# Patient Record
Sex: MALE | URBAN - METROPOLITAN AREA
[De-identification: names, ages, dates, MRNs, and addresses within clinical notes are randomized per-mention and may not be internally consistent; named-entity substitution may affect disease eponyms.]

---

## 2021-04-01 ENCOUNTER — OFFICE VISIT (OUTPATIENT)
Dept: URBAN - METROPOLITAN AREA CLINIC 56 | Facility: CLINIC | Age: 86
End: 2021-04-01
Payer: MEDICARE

## 2021-04-01 DIAGNOSIS — H18.421 BAND KERATOPATHY, RIGHT EYE: ICD-10-CM

## 2021-04-01 DIAGNOSIS — H52.4 PRESBYOPIA: ICD-10-CM

## 2021-04-01 DIAGNOSIS — H52.222 REGULAR ASTIGMATISM, LEFT EYE: ICD-10-CM

## 2021-04-01 DIAGNOSIS — H26.492 OTHER SECONDARY CATARACT, LEFT EYE: ICD-10-CM

## 2021-04-01 PROCEDURE — 92134 CPTRZ OPH DX IMG PST SGM RTA: CPT | Performed by: OPTOMETRIST

## 2021-04-01 PROCEDURE — 99204 OFFICE O/P NEW MOD 45 MIN: CPT | Performed by: OPTOMETRIST

## 2021-04-01 ASSESSMENT — VISUAL ACUITY
OS: 20/80
OD: LP

## 2021-04-01 ASSESSMENT — INTRAOCULAR PRESSURE: OS: 13

## 2021-04-01 NOTE — IMPRESSION/PLAN
Impression: Presbyopia: H52.4. Plan: Informed and demonstrated to patient filling this spectacle  prescription will provide little or no improvement in vision due to ophthalmic or visual pathway changes. May revisit after Yag Cap OS.

## 2021-04-01 NOTE — IMPRESSION/PLAN
Impression: Other secondary cataract, left eye: H26.492. Plan: Discussed procedure. Risks, benefits, alternatives reviewed with patient. Patient elects to proceed with YAG. Refer for YAG capsulotomy consultation OS.

## 2021-04-01 NOTE — IMPRESSION/PLAN
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 Plan: Longstanding since 1970's. Unresponsive to surgery.

## 2021-04-16 ENCOUNTER — ADULT PHYSICAL (OUTPATIENT)
Dept: URBAN - METROPOLITAN AREA CLINIC 56 | Facility: CLINIC | Age: 86
End: 2021-04-16
Payer: MEDICARE

## 2021-04-16 DIAGNOSIS — Z01.818 ENCOUNTER FOR OTHER PREPROCEDURAL EXAMINATION: Primary | ICD-10-CM

## 2021-04-16 PROCEDURE — 99203 OFFICE O/P NEW LOW 30 MIN: CPT | Performed by: PHYSICIAN ASSISTANT

## 2021-04-23 ENCOUNTER — SURGERY (OUTPATIENT)
Dept: URBAN - METROPOLITAN AREA SURGERY 19 | Facility: SURGERY | Age: 86
End: 2021-04-23
Payer: MEDICARE

## 2021-04-23 PROCEDURE — 66821 AFTER CATARACT LASER SURGERY: CPT | Performed by: OPHTHALMOLOGY

## 2021-07-22 ENCOUNTER — OFFICE VISIT (OUTPATIENT)
Dept: URBAN - METROPOLITAN AREA CLINIC 44 | Facility: CLINIC | Age: 86
End: 2021-07-22
Payer: MEDICARE

## 2021-07-22 DIAGNOSIS — H35.3122 NEXDTVE AGE-RELATED MCLR DEGN, LEFT EYE, INTERMED DRY STAGE: Primary | ICD-10-CM

## 2021-07-22 DIAGNOSIS — Z96.1 PRESENCE OF INTRAOCULAR LENS: ICD-10-CM

## 2021-07-22 DIAGNOSIS — H33.051 TOTAL RETINAL DETACHMENT, RIGHT EYE: ICD-10-CM

## 2021-07-22 PROCEDURE — 92014 COMPRE OPH EXAM EST PT 1/>: CPT | Performed by: OPTOMETRIST

## 2021-07-22 PROCEDURE — 92134 CPTRZ OPH DX IMG PST SGM RTA: CPT | Performed by: OPTOMETRIST

## 2021-07-22 ASSESSMENT — VISUAL ACUITY
OD: LP
OS: 20/60

## 2021-07-22 ASSESSMENT — INTRAOCULAR PRESSURE: OS: 14

## 2021-07-22 NOTE — IMPRESSION/PLAN
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  RTC 12 months for complete

## 2021-07-22 NOTE — IMPRESSION/PLAN
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 Longstanding since 1970's. Unresponsive to surgery. Eye is comfortable Plan: PLAN: Observe.  RTC 12 months for complete

## 2021-07-22 NOTE — IMPRESSION/PLAN
Impression: Presence of intraocular lens: Z96.1. IOL centered and clear OS. S/P YAG Plan: PLAN: Rx PRN. RTC 12 months for complete. Check position of IOL.

## 2021-07-22 NOTE — IMPRESSION/PLAN
Impression: Nexdtve age-related mclr degn, left eye, intermed dry stage: H35.3122. Per today's exam and OCT no apparent SRF noted. GA accounts for decreased VA OS Plan: PLAN: Reviewed risk associated with smoking and lifestyle changes that support good eye health. Home monitoring with amsler grid. Rec low zinc formulation or no zinc formulation for eye vitamins to further reduce risk of progression. RTC ASAP if notes and decreased vision or distortion in vision otherwise Rec Low Vision consult. Information given to patient. RTC 6 months for dilated fundus exam and OCT (Mac).